# Patient Record
Sex: FEMALE | Race: WHITE | Employment: OTHER | URBAN - METROPOLITAN AREA
[De-identification: names, ages, dates, MRNs, and addresses within clinical notes are randomized per-mention and may not be internally consistent; named-entity substitution may affect disease eponyms.]

---

## 2018-03-20 ENCOUNTER — HOSPITAL ENCOUNTER (OUTPATIENT)
Dept: GENERAL RADIOLOGY | Age: 68
Discharge: HOME | End: 2018-03-22
Payer: MEDICARE

## 2018-03-20 ENCOUNTER — HOSPITAL ENCOUNTER (OUTPATIENT)
Age: 68
Discharge: HOME | End: 2018-03-22
Payer: MEDICARE

## 2018-03-20 DIAGNOSIS — M75.41 CORACOID IMPINGEMENT OF RIGHT SHOULDER: ICD-10-CM

## 2018-03-20 DIAGNOSIS — M79.601 RIGHT UPPER LIMB PAIN: ICD-10-CM

## 2018-03-20 PROCEDURE — 73030 X-RAY EXAM OF SHOULDER: CPT

## 2018-03-20 PROCEDURE — 73060 X-RAY EXAM OF HUMERUS: CPT

## 2018-06-08 ENCOUNTER — HOSPITAL ENCOUNTER (OUTPATIENT)
Dept: MAMMOGRAPHY | Age: 68
Discharge: HOME | End: 2018-06-10
Payer: MEDICARE

## 2018-06-08 DIAGNOSIS — Z13.820 SCREENING FOR OSTEOPOROSIS: ICD-10-CM

## 2018-06-08 PROCEDURE — 77080 DXA BONE DENSITY AXIAL: CPT

## 2018-08-13 ENCOUNTER — ANESTHESIA (OUTPATIENT)
Dept: ENDOSCOPY | Age: 68
End: 2018-08-13
Payer: MEDICARE

## 2018-08-13 ENCOUNTER — ANESTHESIA EVENT (OUTPATIENT)
Dept: ENDOSCOPY | Age: 68
End: 2018-08-13
Payer: MEDICARE

## 2018-08-13 ENCOUNTER — HOSPITAL ENCOUNTER (OUTPATIENT)
Age: 68
Setting detail: OUTPATIENT SURGERY
Discharge: HOME | End: 2018-08-13
Attending: INTERNAL MEDICINE | Admitting: INTERNAL MEDICINE
Payer: MEDICARE

## 2018-08-13 VITALS
OXYGEN SATURATION: 100 % | RESPIRATION RATE: 16 BRPM | DIASTOLIC BLOOD PRESSURE: 61 MMHG | SYSTOLIC BLOOD PRESSURE: 108 MMHG

## 2018-08-13 VITALS
BODY MASS INDEX: 28.85 KG/M2 | HEIGHT: 64 IN | TEMPERATURE: 97 F | OXYGEN SATURATION: 96 % | RESPIRATION RATE: 18 BRPM | HEART RATE: 65 BPM | WEIGHT: 169 LBS | DIASTOLIC BLOOD PRESSURE: 72 MMHG | SYSTOLIC BLOOD PRESSURE: 157 MMHG

## 2018-08-13 PROCEDURE — 88305 TISSUE EXAM BY PATHOLOGIST: CPT

## 2018-08-13 PROCEDURE — 7100000010 HC PHASE II RECOVERY - FIRST 15 MIN: Performed by: INTERNAL MEDICINE

## 2018-08-13 PROCEDURE — 2709999900 HC NON-CHARGEABLE SUPPLY: Performed by: INTERNAL MEDICINE

## 2018-08-13 PROCEDURE — 3700000000 HC ANESTHESIA ATTENDED CARE: Performed by: INTERNAL MEDICINE

## 2018-08-13 PROCEDURE — 2580000003 HC RX 258: Performed by: NURSE ANESTHETIST, CERTIFIED REGISTERED

## 2018-08-13 PROCEDURE — 6360000002 HC RX W HCPCS: Performed by: NURSE ANESTHETIST, CERTIFIED REGISTERED

## 2018-08-13 PROCEDURE — 3700000001 HC ADD 15 MINUTES (ANESTHESIA): Performed by: INTERNAL MEDICINE

## 2018-08-13 PROCEDURE — 3609010300 HC COLONOSCOPY W/BIOPSY SINGLE/MULTIPLE: Performed by: INTERNAL MEDICINE

## 2018-08-13 PROCEDURE — 7100000011 HC PHASE II RECOVERY - ADDTL 15 MIN: Performed by: INTERNAL MEDICINE

## 2018-08-13 PROCEDURE — C1773 RET DEV, INSERTABLE: HCPCS | Performed by: INTERNAL MEDICINE

## 2018-08-13 RX ORDER — SODIUM CHLORIDE 0.9 % (FLUSH) 0.9 %
10 SYRINGE (ML) INJECTION EVERY 12 HOURS SCHEDULED
Status: DISCONTINUED | OUTPATIENT
Start: 2018-08-13 | End: 2018-08-13 | Stop reason: HOSPADM

## 2018-08-13 RX ORDER — SODIUM CHLORIDE 0.9 % (FLUSH) 0.9 %
10 SYRINGE (ML) INJECTION PRN
Status: DISCONTINUED | OUTPATIENT
Start: 2018-08-13 | End: 2018-08-13 | Stop reason: HOSPADM

## 2018-08-13 RX ORDER — SODIUM CHLORIDE, SODIUM LACTATE, POTASSIUM CHLORIDE, CALCIUM CHLORIDE 600; 310; 30; 20 MG/100ML; MG/100ML; MG/100ML; MG/100ML
INJECTION, SOLUTION INTRAVENOUS CONTINUOUS
Status: DISCONTINUED | OUTPATIENT
Start: 2018-08-13 | End: 2018-08-13 | Stop reason: HOSPADM

## 2018-08-13 RX ORDER — PROPOFOL 10 MG/ML
INJECTION, EMULSION INTRAVENOUS CONTINUOUS PRN
Status: DISCONTINUED | OUTPATIENT
Start: 2018-08-13 | End: 2018-08-13 | Stop reason: SDUPTHER

## 2018-08-13 RX ORDER — SODIUM CHLORIDE, SODIUM LACTATE, POTASSIUM CHLORIDE, CALCIUM CHLORIDE 600; 310; 30; 20 MG/100ML; MG/100ML; MG/100ML; MG/100ML
INJECTION, SOLUTION INTRAVENOUS CONTINUOUS PRN
Status: DISCONTINUED | OUTPATIENT
Start: 2018-08-13 | End: 2018-08-13 | Stop reason: SDUPTHER

## 2018-08-13 RX ADMIN — PROPOFOL 100 MCG/KG/MIN: 10 INJECTION, EMULSION INTRAVENOUS at 09:03

## 2018-08-13 RX ADMIN — SODIUM CHLORIDE, POTASSIUM CHLORIDE, SODIUM LACTATE AND CALCIUM CHLORIDE: 600; 310; 30; 20 INJECTION, SOLUTION INTRAVENOUS at 08:59

## 2018-08-13 NOTE — ANESTHESIA POSTPROCEDURE EVALUATION
Department of Anesthesiology  Postprocedure Note    Patient: Arturo Weathers  MRN: [id number]  YOB: 1950  Date of evaluation: 8/13/2018  Time:  12:33 PM     Procedure Summary     Date:  08/13/18 Room / Location:  [facility] ENDO 01 / [address]    Anesthesia Start:  4558 Anesthesia Stop:  2310    Procedure:  COLONOSCOPY WITH BIOPSY (N/A ) Diagnosis:  (SCREENING)    Surgeon:  Jacob Prieto DO Responsible Provider:  Jigar Pedroza MD    Anesthesia Type:  MAC ASA Status:  3          Anesthesia Type: No value filed. Usama Phase I: Usama Score: 10    Usama Phase II:      Last vitals: Reviewed and per EMR flowsheets.        Anesthesia Post Evaluation    Patient location during evaluation: bedside  Patient participation: complete - patient participated  Level of consciousness: awake  Pain score: 2  Airway patency: patent  Nausea & Vomiting: no nausea  Complications: no  Cardiovascular status: blood pressure returned to baseline  Respiratory status: acceptable  Hydration status: euvolemic

## 2018-08-13 NOTE — OP NOTE
[address]                                 OPERATIVE REPORT    PATIENT NAME: Nessa Seen                     :        1950  MED REC NO:   [id number]                            ROOM:  ACCOUNT NO:   [de-identified]                           ADMIT DATE: 2018  PROVIDER:     Zuleyma Quintero DO    DATE OF PROCEDURE:  2018    PATIENT OF:  Dr. Andi Dixon DIAGNOSIS:  History of tubular adenoma of the colon. POSTOPERATIVE DIAGNOSIS:  A 5-mm sessile polyp in the cecum. SURGEON:  Zuleyma Quintero DO    PREOPERATIVE MEDICATION:  MAC.    COMPLICATIONS:  None. ESTIMATED BLOOD LOSS:  0.5 mL. OPERATIVE PROCEDURE:  This 61-year-old female was placed in left lateral  decubitus position after preop medication was given. The patient was very  comfortable throughout the test.  An Olympus video colonoscope was inserted  through the anal sphincter and passed to the cecum with some mild  difficulty with the patient having redundant sigmoid descending colon with  extremely poor tone. The colon prep was good. The cecum was well visualized with the patient having a 5-mm sessile polyp,  which was removed with forceps. The scope was withdrawn; and the  ascending, transverse and descending colon were well visualized with no  masses, polyps, ulcerations, or diverticulosis. No significant  inflammation. Sigmoid colon and rectum were well visualized with the patient having one  focal area of 4 x 2 cm inflammation with no ulcerations or growth. There  was no other evidence of any masses, polyps, diverticulosis, or AV  malformation. Pending pathology report, the patient probably will have repeat colonoscopy  in 5 years.         Roxann Vera DO    D: 2018 9:29:09       T: 2018 10:59:37     [email or web address]  Job#: [id number]     Doc#: [id number]    CC:

## 2018-08-13 NOTE — ANESTHESIA PRE PROCEDURE
Katlin Conner, DO        sodium chloride flush 0.9 % injection 10 mL  10 mL Intravenous PRN Katlin Conner, DO        lactated ringers infusion   Intravenous Continuous Hermes Garcia MD           Allergies:     Allergies   Allergen Reactions    Daypro [Oxaprozin]     Penicillins     Boniva [Ibandronic Acid] Other (See Comments)     \"pain in stomach\"       Problem List:    Patient Active Problem List   Diagnosis Code    Mitral insufficiency I34.0    Pulmonary hypertension ([facility] [address] I27.20    Mitral valve prolapse I34.1       Past Medical History:        Diagnosis Date    A-fib ([facility] [address]     Hyperlipidemia     Mitral insufficiency     Osteopenia        Past Surgical History:        Procedure Laterality Date    BREAST SURGERY      3 different lumectomies all benign    COLONOSCOPY      ECHO COMPL W DOP COLOR FLOW  10/17/2012         FOOT SURGERY      left    HYSTERECTOMY      SKIN BIOPSY      right leg, benign       Social History:    Social History   Substance Use Topics    Smoking status: Never Smoker    Smokeless tobacco: Never Used    Alcohol use No                                Counseling given: Not Answered      Vital Signs (Current):   Vitals:    08/10/18 0901 08/13/18 0800   BP:  (!) 167/79   Pulse:  73   Resp:  18   Temp:  97.6 °F (36.4 °C)   TempSrc:  Temporal   SpO2:  99%   Weight: 170 lb (77.1 kg) 169 lb (76.7 kg)   Height: 5' 4\" (1.626 m) 5' 4\" (1.626 m)                                              BP Readings from Last 3 Encounters:   08/13/18 (!) 167/79   11/14/16 (!) 145/70   07/01/13 130/60       NPO Status: Time of last liquid consumption: 0600                        Time of last solid consumption: 1700                        Date of last liquid consumption: 08/13/18                        Date of last solid food consumption: 08/11/18    BMI:   Wt Readings from Last 3 Encounters:   08/13/18 169 lb (76.7 kg)   11/14/16 168 lb (76.2 kg)   11/01/16 162 lb (73.5 kg)     Body mass index is 29.01 kg/m². CBC:   Lab Results   Component Value Date    WBC 8.3 10/18/2012    RBC 4.59 10/18/2012    HGB 14.1 10/18/2012    HCT 41.1 10/18/2012    MCV 89.4 10/18/2012    RDW 13.0 10/18/2012     10/18/2012       CMP:   Lab Results   Component Value Date     03/06/2015    K 4.7 03/06/2015     03/06/2015    CO2 31 03/06/2015    BUN 21 03/06/2015    CREATININE 1.0 03/06/2015    GFRAA >60 03/06/2015    LABGLOM 56 03/06/2015    GLUCOSE 100 03/06/2015    GLUCOSE 94 05/02/2012    PROT 6.6 03/06/2015    CALCIUM 9.9 03/06/2015    BILITOT 0.3 03/06/2015    ALKPHOS 39 03/06/2015    AST 17 03/06/2015    ALT 14 03/06/2015       POC Tests: No results for input(s): POCGLU, POCNA, POCK, POCCL, POCBUN, POCHEMO, POCHCT in the last 72 hours. Coags:   Lab Results   Component Value Date    PROTIME 13.4 07/01/2013    INR 1.3 07/01/2013       HCG (If Applicable): No results found for: PREGTESTUR, PREGSERUM, HCG, HCGQUANT     ABGs: No results found for: PHART, PO2ART, LGY1TPQ, QPS1KFO, BEART, E1DPLCIQ     Type & Screen (If Applicable):  No results found for: LABABO, [address]    Anesthesia Evaluation  Patient summary reviewed  Airway: Mallampati: III  TM distance: >3 FB   Neck ROM: full  Mouth opening: > = 3 FB Dental: normal exam         Pulmonary:Negative Pulmonary ROS and normal exam                               Cardiovascular:    (+) hypertension:, valvular problems/murmurs: MR, dysrhythmias: atrial fibrillation,                   Neuro/Psych:   Negative Neuro/Psych ROS              GI/Hepatic/Renal: Neg GI/Hepatic/Renal ROS            Endo/Other: Negative Endo/Other ROS                    Abdominal:           Vascular:                                        Anesthesia Plan      MAC     ASA 3       Induction: intravenous. Anesthetic plan and risks discussed with patient. Plan discussed with CRNA.                   Aguila Arciniega MD   8/13/2018

## 2019-07-12 ENCOUNTER — HOSPITAL ENCOUNTER (OUTPATIENT)
Dept: ULTRASOUND IMAGING | Age: 69
Discharge: HOME | End: 2019-07-12
Payer: MEDICARE

## 2019-07-12 DIAGNOSIS — E04.2 MULTIPLE THYROID NODULES: ICD-10-CM

## 2019-07-12 PROCEDURE — 76536 US EXAM OF HEAD AND NECK: CPT

## 2019-08-02 ENCOUNTER — HOSPITAL ENCOUNTER (OUTPATIENT)
Dept: ULTRASOUND IMAGING | Age: 69
Discharge: HOME | End: 2019-08-02
Payer: MEDICARE

## 2019-08-02 DIAGNOSIS — E04.1 LEFT THYROID NODULE: ICD-10-CM

## 2019-08-02 DIAGNOSIS — E04.1 RIGHT THYROID NODULE: ICD-10-CM

## 2019-08-02 PROCEDURE — 88305 TISSUE EXAM BY PATHOLOGIST: CPT

## 2019-08-02 PROCEDURE — 10005 FNA BX W/US GDN 1ST LES: CPT

## 2019-08-02 PROCEDURE — 88173 CYTOPATH EVAL FNA REPORT: CPT

## 2020-07-22 ENCOUNTER — HOSPITAL ENCOUNTER (OUTPATIENT)
Dept: MAMMOGRAPHY | Age: 70
Discharge: HOME | End: 2020-07-24
Payer: MEDICARE

## 2020-07-22 PROCEDURE — 77080 DXA BONE DENSITY AXIAL: CPT

## 2020-08-28 ENCOUNTER — HOSPITAL ENCOUNTER (OUTPATIENT)
Dept: ULTRASOUND IMAGING | Age: 70
Discharge: HOME | End: 2020-08-28
Payer: MEDICARE

## 2020-08-28 ENCOUNTER — HOSPITAL ENCOUNTER (OUTPATIENT)
Dept: MAMMOGRAPHY | Age: 70
Discharge: HOME | End: 2020-08-30
Payer: MEDICARE

## 2020-08-28 PROCEDURE — 77065 DX MAMMO INCL CAD UNI: CPT

## 2020-08-28 PROCEDURE — 76642 ULTRASOUND BREAST LIMITED: CPT

## 2021-03-11 ENCOUNTER — HOSPITAL ENCOUNTER (OUTPATIENT)
Dept: MAMMOGRAPHY | Age: 71
Discharge: HOME | End: 2021-03-13
Payer: MEDICARE

## 2021-03-11 ENCOUNTER — HOSPITAL ENCOUNTER (OUTPATIENT)
Dept: ULTRASOUND IMAGING | Age: 71
End: 2021-03-11
Payer: MEDICARE

## 2021-03-11 DIAGNOSIS — R92.8 ABNORMAL MAMMOGRAM: ICD-10-CM

## 2021-03-11 PROCEDURE — G0279 TOMOSYNTHESIS, MAMMO: HCPCS

## 2022-03-29 ENCOUNTER — HOSPITAL ENCOUNTER (OUTPATIENT)
Dept: MAMMOGRAPHY | Age: 72
Discharge: HOME | End: 2022-03-31
Payer: MEDICARE

## 2022-03-29 VITALS — BODY MASS INDEX: 29.88 KG/M2 | WEIGHT: 175 LBS | HEIGHT: 64 IN

## 2022-03-29 DIAGNOSIS — Z12.31 ENCOUNTER FOR SCREENING MAMMOGRAM FOR MALIGNANT NEOPLASM OF BREAST: ICD-10-CM

## 2022-03-29 PROCEDURE — 77063 BREAST TOMOSYNTHESIS BI: CPT

## 2023-04-25 ENCOUNTER — HOSPITAL ENCOUNTER (OUTPATIENT)
Dept: MAMMOGRAPHY | Age: 73
Discharge: HOME | End: 2023-04-27
Payer: MEDICARE

## 2023-04-25 VITALS — WEIGHT: 173 LBS | HEIGHT: 65 IN | BODY MASS INDEX: 28.82 KG/M2

## 2023-04-25 DIAGNOSIS — Z12.31 ENCOUNTER FOR SCREENING MAMMOGRAM FOR MALIGNANT NEOPLASM OF BREAST: ICD-10-CM

## 2023-04-25 DIAGNOSIS — Z12.31 SCREENING MAMMOGRAM FOR HIGH-RISK PATIENT: ICD-10-CM

## 2023-04-25 PROCEDURE — 77067 SCR MAMMO BI INCL CAD: CPT

## 2023-04-28 ENCOUNTER — TRANSCRIBE ORDERS (OUTPATIENT)
Dept: ADMINISTRATIVE | Age: 73
End: 2023-04-28

## 2023-04-28 DIAGNOSIS — N95.9 MENOPAUSAL DISORDER: Primary | ICD-10-CM

## 2023-06-16 ENCOUNTER — HOSPITAL ENCOUNTER (OUTPATIENT)
Dept: MAMMOGRAPHY | Age: 73
Discharge: HOME | End: 2023-06-18
Payer: MEDICARE

## 2023-06-16 DIAGNOSIS — N95.9 MENOPAUSAL DISORDER: ICD-10-CM

## 2023-06-16 PROCEDURE — 77080 DXA BONE DENSITY AXIAL: CPT

## 2024-03-05 ENCOUNTER — APPOINTMENT (OUTPATIENT)
Dept: RADIOLOGY | Facility: HOSPITAL | Age: 74
End: 2024-03-05
Payer: MEDICARE

## 2024-03-05 ENCOUNTER — HOSPITAL ENCOUNTER (OUTPATIENT)
Facility: HOSPITAL | Age: 74
Setting detail: OBSERVATION
Discharge: HOME | End: 2024-03-06
Attending: EMERGENCY MEDICINE | Admitting: INTERNAL MEDICINE
Payer: MEDICARE

## 2024-03-05 ENCOUNTER — APPOINTMENT (OUTPATIENT)
Dept: CARDIOLOGY | Facility: HOSPITAL | Age: 74
End: 2024-03-05
Payer: MEDICARE

## 2024-03-05 DIAGNOSIS — K21.9 GASTROESOPHAGEAL REFLUX DISEASE, UNSPECIFIED WHETHER ESOPHAGITIS PRESENT: Primary | ICD-10-CM

## 2024-03-05 DIAGNOSIS — K21.9 CHEST PAIN DUE TO GERD: ICD-10-CM

## 2024-03-05 DIAGNOSIS — R07.9 CHEST PAIN DUE TO GERD: ICD-10-CM

## 2024-03-05 LAB
ALBUMIN SERPL BCP-MCNC: 4.4 G/DL (ref 3.4–5)
ALP SERPL-CCNC: 51 U/L (ref 33–136)
ALT SERPL W P-5'-P-CCNC: 59 U/L (ref 7–45)
ANION GAP SERPL CALC-SCNC: 11 MMOL/L (ref 10–20)
AST SERPL W P-5'-P-CCNC: 87 U/L (ref 9–39)
BASOPHILS # BLD AUTO: 0.03 X10*3/UL (ref 0–0.1)
BASOPHILS NFR BLD AUTO: 0.2 %
BILIRUB SERPL-MCNC: 1.3 MG/DL (ref 0–1.2)
BUN SERPL-MCNC: 21 MG/DL (ref 6–23)
CALCIUM SERPL-MCNC: 9.5 MG/DL (ref 8.6–10.3)
CARDIAC TROPONIN I PNL SERPL HS: 5 NG/L (ref 0–13)
CARDIAC TROPONIN I PNL SERPL HS: 6 NG/L (ref 0–13)
CHLORIDE SERPL-SCNC: 100 MMOL/L (ref 98–107)
CO2 SERPL-SCNC: 29 MMOL/L (ref 21–32)
CREAT SERPL-MCNC: 1.05 MG/DL (ref 0.5–1.05)
D DIMER PPP FEU-MCNC: 268 NG/ML FEU
EGFRCR SERPLBLD CKD-EPI 2021: 57 ML/MIN/1.73M*2
EOSINOPHIL # BLD AUTO: 0.09 X10*3/UL (ref 0–0.4)
EOSINOPHIL NFR BLD AUTO: 0.7 %
ERYTHROCYTE [DISTWIDTH] IN BLOOD BY AUTOMATED COUNT: 12.5 % (ref 11.5–14.5)
GLUCOSE SERPL-MCNC: 139 MG/DL (ref 74–99)
HCT VFR BLD AUTO: 41.7 % (ref 36–46)
HGB BLD-MCNC: 13.9 G/DL (ref 12–16)
HOLD SPECIMEN: NORMAL
IMM GRANULOCYTES # BLD AUTO: 0.03 X10*3/UL (ref 0–0.5)
IMM GRANULOCYTES NFR BLD AUTO: 0.2 % (ref 0–0.9)
LYMPHOCYTES # BLD AUTO: 1.24 X10*3/UL (ref 0.8–3)
LYMPHOCYTES NFR BLD AUTO: 10 %
MCH RBC QN AUTO: 30.8 PG (ref 26–34)
MCHC RBC AUTO-ENTMCNC: 33.3 G/DL (ref 32–36)
MCV RBC AUTO: 92 FL (ref 80–100)
MONOCYTES # BLD AUTO: 0.86 X10*3/UL (ref 0.05–0.8)
MONOCYTES NFR BLD AUTO: 6.9 %
NEUTROPHILS # BLD AUTO: 10.18 X10*3/UL (ref 1.6–5.5)
NEUTROPHILS NFR BLD AUTO: 82 %
NRBC BLD-RTO: 0 /100 WBCS (ref 0–0)
PLATELET # BLD AUTO: 217 X10*3/UL (ref 150–450)
POTASSIUM SERPL-SCNC: 4.1 MMOL/L (ref 3.5–5.3)
PROT SERPL-MCNC: 7.3 G/DL (ref 6.4–8.2)
RBC # BLD AUTO: 4.52 X10*6/UL (ref 4–5.2)
SODIUM SERPL-SCNC: 136 MMOL/L (ref 136–145)
WBC # BLD AUTO: 12.4 X10*3/UL (ref 4.4–11.3)

## 2024-03-05 PROCEDURE — 36415 COLL VENOUS BLD VENIPUNCTURE: CPT | Performed by: EMERGENCY MEDICINE

## 2024-03-05 PROCEDURE — 71045 X-RAY EXAM CHEST 1 VIEW: CPT

## 2024-03-05 PROCEDURE — 93005 ELECTROCARDIOGRAM TRACING: CPT

## 2024-03-05 PROCEDURE — 94760 N-INVAS EAR/PLS OXIMETRY 1: CPT

## 2024-03-05 PROCEDURE — 84484 ASSAY OF TROPONIN QUANT: CPT | Performed by: EMERGENCY MEDICINE

## 2024-03-05 PROCEDURE — 2500000001 HC RX 250 WO HCPCS SELF ADMINISTERED DRUGS (ALT 637 FOR MEDICARE OP)

## 2024-03-05 PROCEDURE — G0378 HOSPITAL OBSERVATION PER HR: HCPCS

## 2024-03-05 PROCEDURE — 2500000004 HC RX 250 GENERAL PHARMACY W/ HCPCS (ALT 636 FOR OP/ED): Performed by: NURSE PRACTITIONER

## 2024-03-05 PROCEDURE — 2500000001 HC RX 250 WO HCPCS SELF ADMINISTERED DRUGS (ALT 637 FOR MEDICARE OP): Performed by: NURSE PRACTITIONER

## 2024-03-05 PROCEDURE — 96360 HYDRATION IV INFUSION INIT: CPT

## 2024-03-05 PROCEDURE — 80053 COMPREHEN METABOLIC PANEL: CPT | Performed by: EMERGENCY MEDICINE

## 2024-03-05 PROCEDURE — 71045 X-RAY EXAM CHEST 1 VIEW: CPT | Performed by: RADIOLOGY

## 2024-03-05 PROCEDURE — 2500000004 HC RX 250 GENERAL PHARMACY W/ HCPCS (ALT 636 FOR OP/ED): Performed by: EMERGENCY MEDICINE

## 2024-03-05 PROCEDURE — 85025 COMPLETE CBC W/AUTO DIFF WBC: CPT | Performed by: EMERGENCY MEDICINE

## 2024-03-05 PROCEDURE — 99285 EMERGENCY DEPT VISIT HI MDM: CPT | Mod: 25

## 2024-03-05 PROCEDURE — 85379 FIBRIN DEGRADATION QUANT: CPT | Performed by: EMERGENCY MEDICINE

## 2024-03-05 PROCEDURE — 2500000001 HC RX 250 WO HCPCS SELF ADMINISTERED DRUGS (ALT 637 FOR MEDICARE OP): Performed by: EMERGENCY MEDICINE

## 2024-03-05 RX ORDER — NAPROXEN SODIUM 220 MG/1
TABLET, FILM COATED ORAL
Status: COMPLETED
Start: 2024-03-05 | End: 2024-03-05

## 2024-03-05 RX ORDER — ACETAMINOPHEN 325 MG/1
650 TABLET ORAL EVERY 4 HOURS PRN
Status: DISCONTINUED | OUTPATIENT
Start: 2024-03-05 | End: 2024-03-06 | Stop reason: HOSPADM

## 2024-03-05 RX ORDER — TALC
3 POWDER (GRAM) TOPICAL DAILY
Status: DISCONTINUED | OUTPATIENT
Start: 2024-03-05 | End: 2024-03-06 | Stop reason: HOSPADM

## 2024-03-05 RX ORDER — CALCIUM CARBONATE/VITAMIN D3 600MG-5MCG
2 TABLET ORAL DAILY
COMMUNITY

## 2024-03-05 RX ORDER — ALENDRONATE SODIUM 70 MG/1
70 TABLET ORAL
COMMUNITY

## 2024-03-05 RX ORDER — AMIODARONE HYDROCHLORIDE 100 MG/1
100 TABLET ORAL DAILY
COMMUNITY

## 2024-03-05 RX ORDER — ONDANSETRON HYDROCHLORIDE 2 MG/ML
4 INJECTION, SOLUTION INTRAVENOUS EVERY 8 HOURS PRN
Status: DISCONTINUED | OUTPATIENT
Start: 2024-03-05 | End: 2024-03-06 | Stop reason: HOSPADM

## 2024-03-05 RX ORDER — LIDOCAINE HYDROCHLORIDE 20 MG/ML
15 SOLUTION OROPHARYNGEAL ONCE
Status: COMPLETED | OUTPATIENT
Start: 2024-03-05 | End: 2024-03-05

## 2024-03-05 RX ORDER — ATORVASTATIN CALCIUM 20 MG/1
20 TABLET, FILM COATED ORAL DAILY
COMMUNITY

## 2024-03-05 RX ORDER — PROPRANOLOL HYDROCHLORIDE 120 MG/1
120 CAPSULE, EXTENDED RELEASE ORAL DAILY
COMMUNITY

## 2024-03-05 RX ORDER — SODIUM CHLORIDE 9 MG/ML
125 INJECTION, SOLUTION INTRAVENOUS CONTINUOUS
Status: DISCONTINUED | OUTPATIENT
Start: 2024-03-05 | End: 2024-03-05

## 2024-03-05 RX ORDER — ALUMINUM HYDROXIDE, MAGNESIUM HYDROXIDE, AND SIMETHICONE 1200; 120; 1200 MG/30ML; MG/30ML; MG/30ML
30 SUSPENSION ORAL ONCE
Status: COMPLETED | OUTPATIENT
Start: 2024-03-05 | End: 2024-03-05

## 2024-03-05 RX ORDER — ASPIRIN 325 MG
325 TABLET ORAL DAILY
COMMUNITY

## 2024-03-05 RX ORDER — ASPIRIN 325 MG
200 TABLET, DELAYED RELEASE (ENTERIC COATED) ORAL DAILY
COMMUNITY

## 2024-03-05 RX ORDER — ACETAMINOPHEN 500 MG
5000 TABLET ORAL DAILY
COMMUNITY

## 2024-03-05 RX ORDER — LIDOCAINE HYDROCHLORIDE 20 MG/ML
1.25 SOLUTION OROPHARYNGEAL ONCE
Status: DISCONTINUED | OUTPATIENT
Start: 2024-03-05 | End: 2024-03-05

## 2024-03-05 RX ORDER — ENOXAPARIN SODIUM 100 MG/ML
40 INJECTION SUBCUTANEOUS EVERY 24 HOURS
Status: DISCONTINUED | OUTPATIENT
Start: 2024-03-05 | End: 2024-03-06 | Stop reason: HOSPADM

## 2024-03-05 RX ORDER — PANTOPRAZOLE SODIUM 40 MG/1
40 TABLET, DELAYED RELEASE ORAL
Status: DISCONTINUED | OUTPATIENT
Start: 2024-03-05 | End: 2024-03-06 | Stop reason: HOSPADM

## 2024-03-05 RX ORDER — VITAMIN E MIXED 400 UNIT
400 CAPSULE ORAL DAILY
COMMUNITY

## 2024-03-05 RX ORDER — NAPROXEN SODIUM 220 MG/1
324 TABLET, FILM COATED ORAL ONCE
Status: COMPLETED | OUTPATIENT
Start: 2024-03-05 | End: 2024-03-05

## 2024-03-05 RX ORDER — SODIUM CHLORIDE, SODIUM LACTATE, POTASSIUM CHLORIDE, CALCIUM CHLORIDE 600; 310; 30; 20 MG/100ML; MG/100ML; MG/100ML; MG/100ML
100 INJECTION, SOLUTION INTRAVENOUS CONTINUOUS
Status: DISCONTINUED | OUTPATIENT
Start: 2024-03-05 | End: 2024-03-06 | Stop reason: HOSPADM

## 2024-03-05 RX ORDER — LIDOCAINE HYDROCHLORIDE 20 MG/ML
SOLUTION OROPHARYNGEAL
Status: COMPLETED
Start: 2024-03-05 | End: 2024-03-05

## 2024-03-05 RX ORDER — BISMUTH SUBSALICYLATE 262 MG
1 TABLET,CHEWABLE ORAL DAILY
COMMUNITY

## 2024-03-05 RX ORDER — ONDANSETRON 4 MG/1
4 TABLET, ORALLY DISINTEGRATING ORAL EVERY 8 HOURS PRN
Status: DISCONTINUED | OUTPATIENT
Start: 2024-03-05 | End: 2024-03-06 | Stop reason: HOSPADM

## 2024-03-05 RX ORDER — POLYETHYLENE GLYCOL 3350 17 G/17G
17 POWDER, FOR SOLUTION ORAL DAILY PRN
Status: DISCONTINUED | OUTPATIENT
Start: 2024-03-05 | End: 2024-03-06 | Stop reason: HOSPADM

## 2024-03-05 RX ADMIN — NAPROXEN SODIUM 324 MG: 220 TABLET, FILM COATED ORAL at 15:12

## 2024-03-05 RX ADMIN — ALUMINUM HYDROXIDE, MAGNESIUM HYDROXIDE, AND SIMETHICONE 30 ML: 200; 200; 20 SUSPENSION ORAL at 16:44

## 2024-03-05 RX ADMIN — LIDOCAINE HYDROCHLORIDE 15 ML: 20 SOLUTION ORAL; TOPICAL at 16:52

## 2024-03-05 RX ADMIN — NITROGLYCERIN 0.5 INCH: 20 OINTMENT TOPICAL at 15:12

## 2024-03-05 RX ADMIN — ENOXAPARIN SODIUM 40 MG: 40 INJECTION SUBCUTANEOUS at 20:59

## 2024-03-05 RX ADMIN — SODIUM CHLORIDE 125 ML/HR: 9 INJECTION, SOLUTION INTRAVENOUS at 15:11

## 2024-03-05 RX ADMIN — MELATONIN TAB 3 MG 3 MG: 3 TAB at 20:59

## 2024-03-05 RX ADMIN — SODIUM CHLORIDE, POTASSIUM CHLORIDE, SODIUM LACTATE AND CALCIUM CHLORIDE 100 ML/HR: 600; 310; 30; 20 INJECTION, SOLUTION INTRAVENOUS at 20:59

## 2024-03-05 RX ADMIN — ASPIRIN 81 MG CHEWABLE TABLET 324 MG: 81 TABLET CHEWABLE at 15:12

## 2024-03-05 RX ADMIN — LIDOCAINE HYDROCHLORIDE 15 ML: 20 SOLUTION OROPHARYNGEAL at 16:52

## 2024-03-05 SDOH — SOCIAL STABILITY: SOCIAL INSECURITY: DO YOU FEEL ANYONE HAS EXPLOITED OR TAKEN ADVANTAGE OF YOU FINANCIALLY OR OF YOUR PERSONAL PROPERTY?: NO

## 2024-03-05 SDOH — SOCIAL STABILITY: SOCIAL INSECURITY: WERE YOU ABLE TO COMPLETE ALL THE BEHAVIORAL HEALTH SCREENINGS?: YES

## 2024-03-05 SDOH — SOCIAL STABILITY: SOCIAL INSECURITY: ABUSE: ADULT

## 2024-03-05 SDOH — SOCIAL STABILITY: SOCIAL INSECURITY: HAS ANYONE EVER THREATENED TO HURT YOUR FAMILY OR YOUR PETS?: NO

## 2024-03-05 SDOH — SOCIAL STABILITY: SOCIAL INSECURITY: ARE THERE ANY APPARENT SIGNS OF INJURIES/BEHAVIORS THAT COULD BE RELATED TO ABUSE/NEGLECT?: NO

## 2024-03-05 SDOH — SOCIAL STABILITY: SOCIAL INSECURITY: DOES ANYONE TRY TO KEEP YOU FROM HAVING/CONTACTING OTHER FRIENDS OR DOING THINGS OUTSIDE YOUR HOME?: NO

## 2024-03-05 SDOH — SOCIAL STABILITY: SOCIAL INSECURITY: HAVE YOU HAD THOUGHTS OF HARMING ANYONE ELSE?: NO

## 2024-03-05 SDOH — SOCIAL STABILITY: SOCIAL INSECURITY: DO YOU FEEL UNSAFE GOING BACK TO THE PLACE WHERE YOU ARE LIVING?: NO

## 2024-03-05 SDOH — SOCIAL STABILITY: SOCIAL INSECURITY: ARE YOU OR HAVE YOU BEEN THREATENED OR ABUSED PHYSICALLY, EMOTIONALLY, OR SEXUALLY BY ANYONE?: NO

## 2024-03-05 ASSESSMENT — LIFESTYLE VARIABLES
AUDIT-C TOTAL SCORE: 0
HOW OFTEN DO YOU HAVE A DRINK CONTAINING ALCOHOL: NEVER
HOW OFTEN DO YOU HAVE 6 OR MORE DRINKS ON ONE OCCASION: NEVER
SKIP TO QUESTIONS 9-10: 1
AUDIT-C TOTAL SCORE: 0
HOW MANY STANDARD DRINKS CONTAINING ALCOHOL DO YOU HAVE ON A TYPICAL DAY: PATIENT DOES NOT DRINK

## 2024-03-05 ASSESSMENT — COGNITIVE AND FUNCTIONAL STATUS - GENERAL
PATIENT BASELINE BEDBOUND: NO
DAILY ACTIVITIY SCORE: 24
MOBILITY SCORE: 24

## 2024-03-05 ASSESSMENT — PAIN DESCRIPTION - DESCRIPTORS: DESCRIPTORS: ACHING

## 2024-03-05 ASSESSMENT — COLUMBIA-SUICIDE SEVERITY RATING SCALE - C-SSRS
2. HAVE YOU ACTUALLY HAD ANY THOUGHTS OF KILLING YOURSELF?: NO
6. HAVE YOU EVER DONE ANYTHING, STARTED TO DO ANYTHING, OR PREPARED TO DO ANYTHING TO END YOUR LIFE?: NO
1. IN THE PAST MONTH, HAVE YOU WISHED YOU WERE DEAD OR WISHED YOU COULD GO TO SLEEP AND NOT WAKE UP?: NO

## 2024-03-05 ASSESSMENT — PATIENT HEALTH QUESTIONNAIRE - PHQ9
2. FEELING DOWN, DEPRESSED OR HOPELESS: NOT AT ALL
1. LITTLE INTEREST OR PLEASURE IN DOING THINGS: NOT AT ALL
SUM OF ALL RESPONSES TO PHQ9 QUESTIONS 1 & 2: 0

## 2024-03-05 ASSESSMENT — PAIN - FUNCTIONAL ASSESSMENT
PAIN_FUNCTIONAL_ASSESSMENT: 0-10
PAIN_FUNCTIONAL_ASSESSMENT: 0-10

## 2024-03-05 ASSESSMENT — HEART SCORE
HISTORY: HIGHLY SUSPICIOUS
AGE: 65+
ECG: NON-SPECIFIC REPOLARIZATION DISTURBANCE
RISK FACTORS: 1-2 RISK FACTORS
HEART SCORE: 6
TROPONIN: LESS THAN OR EQUAL TO NORMAL LIMIT

## 2024-03-05 ASSESSMENT — ACTIVITIES OF DAILY LIVING (ADL)
HEARING - RIGHT EAR: FUNCTIONAL
FEEDING YOURSELF: INDEPENDENT
BATHING: INDEPENDENT
DRESSING YOURSELF: INDEPENDENT
WALKS IN HOME: INDEPENDENT
TOILETING: INDEPENDENT
LACK_OF_TRANSPORTATION: NO
HEARING - LEFT EAR: FUNCTIONAL
JUDGMENT_ADEQUATE_SAFELY_COMPLETE_DAILY_ACTIVITIES: YES
ADEQUATE_TO_COMPLETE_ADL: YES
PATIENT'S MEMORY ADEQUATE TO SAFELY COMPLETE DAILY ACTIVITIES?: YES
GROOMING: INDEPENDENT

## 2024-03-05 ASSESSMENT — PAIN DESCRIPTION - LOCATION: LOCATION: CHEST

## 2024-03-05 ASSESSMENT — PAIN DESCRIPTION - PAIN TYPE: TYPE: ACUTE PAIN

## 2024-03-05 ASSESSMENT — PAIN SCALES - GENERAL
PAINLEVEL_OUTOF10: 0 - NO PAIN
PAINLEVEL_OUTOF10: 6

## 2024-03-05 NOTE — ED PROVIDER NOTES
[facility]   ED  Provider Note  3/5/2024  2:04 PM  AC05/AC05        History of Present Illness:   Pati Maddox is a 73 y.o. female presenting to the ED for chest pain, beginning 1 hour prior to arrival.  The complaint has been persistent, veer in severity, and worsened by nothing.  There are epigastric pain rating up into her chest jaw and left shoulder 1 hour prior to arrival.  She rated the pain as a 9 out of 10.  Is improved significantly is now down to 5 out of 10.  She describes nausea and diaphoresis to go with this.  She denies previous such episodes.  There is a strong family history of coronary artery disease.  The patient has no history of coronary disease,      Review of Systems:   Pertinent positives and review of systems as noted above.  Remaining 10 review of systems is negative or noncontributory to today's episode of care.  Review of Systems       --------------------------------------------- PAST HISTORY ---------------------------------------------  Past Medical History:  has no past medical history on file.    Past Surgical History:  has no past surgical history on file.    Social History:  reports that she has never smoked. She has never used smokeless tobacco.    Family History: family history is not on file. Unless otherwise noted, family history is non contributory    Patient's Medications    No medications on file      The patient’s home medications have been reviewed.    Allergies: Penicillins    -------------------------------------------------- RESULTS -------------------------------------------------  All laboratory and radiology results have been personally reviewed by myself   LABS:  Labs Reviewed   CBC WITH AUTO DIFFERENTIAL - Abnormal       Result Value    WBC 12.4 (*)     nRBC 0.0      RBC 4.52      Hemoglobin 13.9      Hematocrit 41.7      MCV 92      MCH 30.8      MCHC 33.3      RDW 12.5      Platelets 217      Neutrophils % 82.0      Immature Granulocytes %, Automated 0.2       Lymphocytes % 10.0      Monocytes % 6.9      Eosinophils % 0.7      Basophils % 0.2      Neutrophils Absolute 10.18 (*)     Immature Granulocytes Absolute, Automated 0.03      Lymphocytes Absolute 1.24      Monocytes Absolute 0.86 (*)     Eosinophils Absolute 0.09      Basophils Absolute 0.03     COMPREHENSIVE METABOLIC PANEL - Abnormal    Glucose 139 (*)     Sodium 136      Potassium 4.1      Chloride 100      Bicarbonate 29      Anion Gap 11      Urea Nitrogen 21      Creatinine 1.05      eGFR 57 (*)     Calcium 9.5      Albumin 4.4      Alkaline Phosphatase 51      Total Protein 7.3      AST 87 (*)     Bilirubin, Total 1.3 (*)     ALT 59 (*)    TROPONIN I, HIGH SENSITIVITY - Normal    Troponin I, High Sensitivity 5      Narrative:     Less than 99th percentile of normal range cutoff-  Female and children under 18 years old <14 ng/L; Male <21 ng/L: Negative  Repeat testing should be performed if clinically indicated.     Female and children under 18 years old 14-50 ng/L; Male 21-50 ng/L:  Consistent with possible cardiac damage and possible increased clinical   risk. Serial measurements may help to assess extent of myocardial damage.     >50 ng/L: Consistent with cardiac damage, increased clinical risk and  myocardial infarction. Serial measurements may help assess extent of   myocardial damage.      NOTE: Children less than 1 year old may have higher baseline troponin   levels and results should be interpreted in conjunction with the overall   clinical context.     NOTE: Troponin I testing is performed using a different   testing methodology at [facility] than at other   [facility]. Direct result comparisons should only   be made within the same method.   TROPONIN I, HIGH SENSITIVITY - Normal    Troponin I, High Sensitivity 6      Narrative:     Less than 99th percentile of normal range cutoff-  Female and children under 18 years old <14 ng/L; Male <21 ng/L: Negative  Repeat testing should be  performed if clinically indicated.     Female and children under 18 years old 14-50 ng/L; Male 21-50 ng/L:  Consistent with possible cardiac damage and possible increased clinical   risk. Serial measurements may help to assess extent of myocardial damage.     >50 ng/L: Consistent with cardiac damage, increased clinical risk and  myocardial infarction. Serial measurements may help assess extent of   myocardial damage.      NOTE: Children less than 1 year old may have higher baseline troponin   levels and results should be interpreted in conjunction with the overall   clinical context.     NOTE: Troponin I testing is performed using a different   testing methodology at [facility] than at other   [facility]. Direct result comparisons should only   be made within the same method.   D-DIMER, NON VTE - Normal    D-Dimer Non VTE, Quant (ng/mL FEU) 268      Narrative:     The D-Dimer assay is reported in ng/mL Fibrinogen Equivalent Units (FEU). The results of this assay should NOT be used for the exclusion of Deep Vein Thrombosis and/or Pulmonary Embolism.   GRAY TOP    Extra Tube Hold for add-ons.       EKG: Sinus rhythm at 75 bpm, normal axis, normal's, normal EKG, interpreted by RENATO Mcpherson MD.  EKG #2: Sinus rhythm at 64 bpm, normal axis, normal levels, no acute ST elevations, normal EKG.  Interpreted by RENATO Mcpherson MD    RADIOLOGY:  Interpreted by Radiologist.  XR chest 1 view   Final Result   No active disease in the chest.        MACRO:   None        Signed by: Lukas Espitia 3/5/2024 3:20 PM   Dictation workstation:   [id number]          Encounter Date: 03/05/24   ECG 12 lead   Result Value    Ventricular Rate 64    Atrial Rate 64    PA Interval 148    QRS Duration 82    QT Interval 436    QTC Calculation(Bazett) 449    P Axis 64    R Axis 35    T Axis 16    QRS Count 11    Q Onset 223    P Onset 149    P Offset 210    T Offset 441    QTC Fredericia 445    Narrative    Normal sinus rhythm  Normal  "ECG  When compared with ECG of 05-MAR-2024 14:10, (unconfirmed)  No significant change was found     ------------------------- NURSING NOTES AND VITALS REVIEWED ---------------------------   The nursing notes within the ED encounter and vital signs as below have been reviewed.   BP (!) 187/76   Pulse 63   Temp 36.8 °C (98.2 °F)   Resp 16   Ht 1.651 m (5' 5\")   Wt 67.5 kg (148 lb 13 oz)   SpO2 100%   BMI 24.76 kg/m²   Oxygen Saturation Interpretation: Normal      ---------------------------------------------------PHYSICAL EXAM--------------------------------------  Physical Exam   Constitutional/General: Alert and oriented x3, well appearing, non toxic in NAD  Head: Normocephalic and atraumatic  Eyes: PERRL, EOMI, conjunctiva normal, sclera non icteric  Mouth: Oropharynx clear, handling secretions, no trismus, no asymmetry of the posterior oropharynx or uvular edema  Neck: Supple, full ROM, non tender to palpation in the midline, no stridor, no crepitus, no meningeal signs  Respiratory: Lungs clear to auscultation bilaterally, no wheezes, rales, or rhonchi. Not in respiratory distress  Cardiovascular:  Regular rate. Regular rhythm. No murmurs, gallops, or rubs. 2+ distal pulses  Chest: No chest wall tenderness  GI:  Abdomen Soft, Non tender, Non distended.  +BS. No organomegaly, no palpable masses,  No rebound, guarding, or rigidity.   Musculoskeletal: Moves all extremities x 4. Warm and well perfused, no clubbing, cyanosis, or edema. Capillary refill <3 seconds  Integument: skin warm and dry. No rashes.   Lymphatic: no lymphadenopathy noted  Neurologic: No focal deficits, symmetric strength 5/5 in the upper and lower extremities bilaterally  Psychiatric: Normal Affect    Procedures    ------------------------------ ED COURSE/MEDICAL DECISION MAKING----------------------  Diagnoses as of 03/11/24 0817   Chest pain due to GERD      Patient has a heart score of 3 and a history of GERD in the past.  She her EKG " and cardiac enzymes are normal.  Chest x-ray is clear.  She has had good relief with symptomatic treatment for GERD.  She is stable for outpatient management.  She is advised follow-up with primary care doctor later this week.      Medical Decision Making:   Discharged to home  Diagnoses as of 03/11/24 0817   Chest pain due to GERD      Counseling:   The emergency provider has spoken with the patient and discussed today’s results, in addition to providing specific details for the plan of care and counseling regarding the diagnosis and prognosis.  Questions are answered at this time and they are agreeable with the plan.      --------------------------------- IMPRESSION AND DISPOSITION ---------------------------------        IMPRESSION  1. Gastroesophageal reflux disease, unspecified whether esophagitis present        DISPOSITION  Disposition: Discharge to home  Patient condition is fair      Billing Provider Critical Care Time: 0 minutes     Kraig Mcpherson MD  03/11/24 0803       Kraig Mcpherson MD  03/11/24 0806       Kraig Mcpherson MD  03/11/24 0815       Kraig Mcpherson MD  03/11/24 0817

## 2024-03-06 VITALS
HEIGHT: 65 IN | OXYGEN SATURATION: 94 % | WEIGHT: 171.4 LBS | HEART RATE: 73 BPM | TEMPERATURE: 99.4 F | BODY MASS INDEX: 28.56 KG/M2 | DIASTOLIC BLOOD PRESSURE: 69 MMHG | SYSTOLIC BLOOD PRESSURE: 142 MMHG | RESPIRATION RATE: 16 BRPM

## 2024-03-06 LAB
ALBUMIN SERPL BCP-MCNC: 3.4 G/DL (ref 3.4–5)
ANION GAP SERPL CALC-SCNC: 9 MMOL/L (ref 10–20)
BUN SERPL-MCNC: 15 MG/DL (ref 6–23)
CALCIUM SERPL-MCNC: 9 MG/DL (ref 8.6–10.3)
CHLORIDE SERPL-SCNC: 105 MMOL/L (ref 98–107)
CO2 SERPL-SCNC: 27 MMOL/L (ref 21–32)
CREAT SERPL-MCNC: 0.95 MG/DL (ref 0.5–1.05)
EGFRCR SERPLBLD CKD-EPI 2021: 64 ML/MIN/1.73M*2
ERYTHROCYTE [DISTWIDTH] IN BLOOD BY AUTOMATED COUNT: 12.7 % (ref 11.5–14.5)
GLUCOSE SERPL-MCNC: 94 MG/DL (ref 74–99)
HCT VFR BLD AUTO: 35 % (ref 36–46)
HGB BLD-MCNC: 12.1 G/DL (ref 12–16)
MAGNESIUM SERPL-MCNC: 1.93 MG/DL (ref 1.6–2.4)
MCH RBC QN AUTO: 31.8 PG (ref 26–34)
MCHC RBC AUTO-ENTMCNC: 34.6 G/DL (ref 32–36)
MCV RBC AUTO: 92 FL (ref 80–100)
NRBC BLD-RTO: 0 /100 WBCS (ref 0–0)
PHOSPHATE SERPL-MCNC: 3.3 MG/DL (ref 2.5–4.9)
PLATELET # BLD AUTO: 175 X10*3/UL (ref 150–450)
POTASSIUM SERPL-SCNC: 3.7 MMOL/L (ref 3.5–5.3)
RBC # BLD AUTO: 3.81 X10*6/UL (ref 4–5.2)
SODIUM SERPL-SCNC: 137 MMOL/L (ref 136–145)
WBC # BLD AUTO: 5.7 X10*3/UL (ref 4.4–11.3)

## 2024-03-06 PROCEDURE — 2500000004 HC RX 250 GENERAL PHARMACY W/ HCPCS (ALT 636 FOR OP/ED): Performed by: NURSE PRACTITIONER

## 2024-03-06 PROCEDURE — 83735 ASSAY OF MAGNESIUM: CPT | Performed by: NURSE PRACTITIONER

## 2024-03-06 PROCEDURE — G0378 HOSPITAL OBSERVATION PER HR: HCPCS

## 2024-03-06 PROCEDURE — 36415 COLL VENOUS BLD VENIPUNCTURE: CPT | Performed by: NURSE PRACTITIONER

## 2024-03-06 PROCEDURE — 80069 RENAL FUNCTION PANEL: CPT | Performed by: NURSE PRACTITIONER

## 2024-03-06 PROCEDURE — 2500000001 HC RX 250 WO HCPCS SELF ADMINISTERED DRUGS (ALT 637 FOR MEDICARE OP): Performed by: NURSE PRACTITIONER

## 2024-03-06 PROCEDURE — 85027 COMPLETE CBC AUTOMATED: CPT | Performed by: NURSE PRACTITIONER

## 2024-03-06 RX ORDER — ASPIRIN 325 MG
325 TABLET ORAL DAILY
Status: DISCONTINUED | OUTPATIENT
Start: 2024-03-06 | End: 2024-03-06 | Stop reason: HOSPADM

## 2024-03-06 RX ORDER — PROPRANOLOL HYDROCHLORIDE 60 MG/1
120 CAPSULE, EXTENDED RELEASE ORAL DAILY
Status: DISCONTINUED | OUTPATIENT
Start: 2024-03-06 | End: 2024-03-06 | Stop reason: HOSPADM

## 2024-03-06 RX ORDER — FERROUS SULFATE, DRIED 160(50) MG
2 TABLET, EXTENDED RELEASE ORAL DAILY
Status: DISCONTINUED | OUTPATIENT
Start: 2024-03-06 | End: 2024-03-06 | Stop reason: HOSPADM

## 2024-03-06 RX ORDER — ALENDRONATE SODIUM 70 MG/1
70 TABLET ORAL
Status: DISCONTINUED | OUTPATIENT
Start: 2024-03-06 | End: 2024-03-06

## 2024-03-06 RX ORDER — ACETAMINOPHEN 500 MG
5000 TABLET ORAL DAILY
Status: DISCONTINUED | OUTPATIENT
Start: 2024-03-06 | End: 2024-03-06 | Stop reason: HOSPADM

## 2024-03-06 RX ORDER — PANTOPRAZOLE SODIUM 40 MG/1
40 TABLET, DELAYED RELEASE ORAL
Qty: 30 TABLET | Refills: 0 | Status: SHIPPED | OUTPATIENT
Start: 2024-03-07

## 2024-03-06 RX ORDER — AMIODARONE HYDROCHLORIDE 200 MG/1
100 TABLET ORAL DAILY
Status: DISCONTINUED | OUTPATIENT
Start: 2024-03-06 | End: 2024-03-06 | Stop reason: HOSPADM

## 2024-03-06 RX ADMIN — SODIUM CHLORIDE, POTASSIUM CHLORIDE, SODIUM LACTATE AND CALCIUM CHLORIDE 100 ML/HR: 600; 310; 30; 20 INJECTION, SOLUTION INTRAVENOUS at 06:38

## 2024-03-06 RX ADMIN — PANTOPRAZOLE SODIUM 40 MG: 40 TABLET, DELAYED RELEASE ORAL at 06:36

## 2024-03-06 ASSESSMENT — COGNITIVE AND FUNCTIONAL STATUS - GENERAL
DAILY ACTIVITIY SCORE: 24
MOBILITY SCORE: 24

## 2024-03-06 ASSESSMENT — PAIN - FUNCTIONAL ASSESSMENT
PAIN_FUNCTIONAL_ASSESSMENT: 0-10
PAIN_FUNCTIONAL_ASSESSMENT: 0-10

## 2024-03-06 ASSESSMENT — PAIN SCALES - GENERAL
PAINLEVEL_OUTOF10: 0 - NO PAIN

## 2024-03-06 NOTE — CARE PLAN
The patient's goals for the shift include  no chest pain    The clinical goals for the shift include patient will not have any episodes of chest pain this shift    Over the shift, the patient did make progress having no episodes of chest pain/discomfort.

## 2024-03-06 NOTE — H&P
"History of Present Illness  Pati Maddox is a 73 y.o. female  with PMHx significant for osteoporosis and HLD. She is on home medications of amiodarone, propanolol and fosomax. No other history is found in review of the chart. Pati presented to the [place] ED with 10/10 epigastric pain that radiated to the sternum after taking a walk and picking up sticks. She thought it was just a bubble and that it would go away. She denies any shortness of breath and there were no relieving factors such as rest. Expressed that she was \"sweaty\" and had some nausea. The nausea resolved after having a bowel movement. She expressed that she used to be on omeprazole before she retired because she would get a raw feeling on her tongue and acid reflux. But that went away when she retired. ED work up essentially benign and was placed on some nitropaste and given asa while waiting for testing. The patient expressed that the GI cocktail that was given in the ED is what really took away the pain in her epigastric area.  Her monitor is unremarkable.     12 Point ROS negative unless noted in above HPI     Past Medical History  History reviewed. No pertinent past medical history.    Surgical History  History reviewed. No pertinent surgical history.     Social History  She reports that she has never smoked. She has never used smokeless tobacco. No history on file for alcohol use and drug use.    Allergies  Boniva [ibandronate], Daypro [oxaprozin], and Penicillins     Physical Exam  Constitutional:       General: She is not in acute distress.     Appearance: Normal appearance. She is not ill-appearing.   HENT:      Head: Atraumatic.      Nose: Nose normal.      Mouth/Throat:      Mouth: Mucous membranes are moist.   Eyes:      Extraocular Movements: Extraocular movements intact.      Pupils: Pupils are equal, round, and reactive to light.   Cardiovascular:      Rate and Rhythm: Normal rate.      Pulses: Normal pulses.   Pulmonary:      " "Effort: Pulmonary effort is normal.      Breath sounds: Normal breath sounds.   Abdominal:      General: Bowel sounds are normal. There is no distension.      Palpations: Abdomen is soft.      Tenderness: There is no abdominal tenderness.   Musculoskeletal:         General: Normal range of motion.      Cervical back: Normal range of motion.   Skin:     General: Skin is warm and dry.      Capillary Refill: Capillary refill takes less than 2 seconds.   Neurological:      General: No focal deficit present.      Mental Status: She is alert and oriented to person, place, and time.   Psychiatric:         Mood and Affect: Mood normal.         Behavior: Behavior normal.          Last Recorded Vitals  Blood pressure 142/69, pulse 73, temperature 37.4 °C (99.4 °F), temperature source Temporal, resp. rate 16, height 1.651 m (5' 5\"), weight 77.7 kg (171 lb 6.4 oz), SpO2 94 %.    Relevant Results  Scheduled medications  alendronate, 70 mg, oral, q7 days  amiodarone, 100 mg, oral, Daily  aspirin, 325 mg, oral, Daily  calcium carbonate-vitamin D3, 2 tablet, oral, Daily  cholecalciferol, 5,000 Units, oral, Daily  enoxaparin, 40 mg, subcutaneous, q24h  melatonin, 3 mg, oral, Daily  pantoprazole, 40 mg, oral, Daily before breakfast  propranolol LA, 120 mg, oral, Daily      Continuous medications  lactated Ringer's, 100 mL/hr, Last Rate: 100 mL/hr (03/06/24 0638)      PRN medications  PRN medications: acetaminophen, ondansetron ODT **OR** ondansetron, polyethylene glycol     Results for orders placed or performed during the hospital encounter of 03/05/24 (from the past 24 hour(s))   CBC with Differential   Result Value Ref Range    WBC 12.4 (H) 4.4 - 11.3 x10*3/uL    nRBC 0.0 0.0 - 0.0 /100 WBCs    RBC 4.52 4.00 - 5.20 x10*6/uL    Hemoglobin 13.9 12.0 - 16.0 g/dL    Hematocrit 41.7 36.0 - 46.0 %    MCV 92 80 - 100 fL    MCH 30.8 26.0 - 34.0 pg    MCHC 33.3 32.0 - 36.0 g/dL    RDW 12.5 11.5 - 14.5 %    Platelets 217 150 - 450 x10*3/uL "    Neutrophils % 82.0 40.0 - 80.0 %    Immature Granulocytes %, Automated 0.2 0.0 - 0.9 %    Lymphocytes % 10.0 13.0 - 44.0 %    Monocytes % 6.9 2.0 - 10.0 %    Eosinophils % 0.7 0.0 - 6.0 %    Basophils % 0.2 0.0 - 2.0 %    Neutrophils Absolute 10.18 (H) 1.60 - 5.50 x10*3/uL    Immature Granulocytes Absolute, Automated 0.03 0.00 - 0.50 x10*3/uL    Lymphocytes Absolute 1.24 0.80 - 3.00 x10*3/uL    Monocytes Absolute 0.86 (H) 0.05 - 0.80 x10*3/uL    Eosinophils Absolute 0.09 0.00 - 0.40 x10*3/uL    Basophils Absolute 0.03 0.00 - 0.10 x10*3/uL   Comprehensive Metabolic Panel   Result Value Ref Range    Glucose 139 (H) 74 - 99 mg/dL    Sodium 136 136 - 145 mmol/L    Potassium 4.1 3.5 - 5.3 mmol/L    Chloride 100 98 - 107 mmol/L    Bicarbonate 29 21 - 32 mmol/L    Anion Gap 11 10 - 20 mmol/L    Urea Nitrogen 21 6 - 23 mg/dL    Creatinine 1.05 0.50 - 1.05 mg/dL    eGFR 57 (L) >60 mL/min/1.73m*2    Calcium 9.5 8.6 - 10.3 mg/dL    Albumin 4.4 3.4 - 5.0 g/dL    Alkaline Phosphatase 51 33 - 136 U/L    Total Protein 7.3 6.4 - 8.2 g/dL    AST 87 (H) 9 - 39 U/L    Bilirubin, Total 1.3 (H) 0.0 - 1.2 mg/dL    ALT 59 (H) 7 - 45 U/L   Troponin I, High Sensitivity   Result Value Ref Range    Troponin I, High Sensitivity 5 0 - 13 ng/L   ECG 12 lead   Result Value Ref Range    Ventricular Rate 75 BPM    Atrial Rate 75 BPM    OK Interval 152 ms    QRS Duration 82 ms    QT Interval 424 ms    QTC Calculation(Bazett) 473 ms    P Axis 65 degrees    R Axis 34 degrees    T Axis 22 degrees    QRS Count 13 beats    Q Onset 223 ms    P Onset 147 ms    P Offset 208 ms    T Offset 435 ms    QTC Fredericia 456 ms   Light Blue Top   Result Value Ref Range    Extra Tube Hold for add-ons.    Red Top   Result Value Ref Range    Extra Tube Hold for add-ons.    Gray Top   Result Value Ref Range    Extra Tube Hold for add-ons.    ECG 12 lead   Result Value Ref Range    Ventricular Rate 64 BPM    Atrial Rate 64 BPM    OK Interval 148 ms    QRS Duration 82  ms    QT Interval 436 ms    QTC Calculation(Bazett) 449 ms    P Axis 64 degrees    R Axis 35 degrees    T Axis 16 degrees    QRS Count 11 beats    Q Onset 223 ms    P Onset 149 ms    P Offset 210 ms    T Offset 441 ms    QTC Fredericia 445 ms   Troponin I, High Sensitivity   Result Value Ref Range    Troponin I, High Sensitivity 6 0 - 13 ng/L   D-dimer, quantitative   Result Value Ref Range    D-Dimer Non VTE, Quant (ng/mL FEU) 268 <=500 ng/mL FEU   CBC   Result Value Ref Range    WBC 5.7 4.4 - 11.3 x10*3/uL    nRBC 0.0 0.0 - 0.0 /100 WBCs    RBC 3.81 (L) 4.00 - 5.20 x10*6/uL    Hemoglobin 12.1 12.0 - 16.0 g/dL    Hematocrit 35.0 (L) 36.0 - 46.0 %    MCV 92 80 - 100 fL    MCH 31.8 26.0 - 34.0 pg    MCHC 34.6 32.0 - 36.0 g/dL    RDW 12.7 11.5 - 14.5 %    Platelets 175 150 - 450 x10*3/uL   Renal Function Panel   Result Value Ref Range    Glucose 94 74 - 99 mg/dL    Sodium 137 136 - 145 mmol/L    Potassium 3.7 3.5 - 5.3 mmol/L    Chloride 105 98 - 107 mmol/L    Bicarbonate 27 21 - 32 mmol/L    Anion Gap 9 (L) 10 - 20 mmol/L    Urea Nitrogen 15 6 - 23 mg/dL    Creatinine 0.95 0.50 - 1.05 mg/dL    eGFR 64 >60 mL/min/1.73m*2    Calcium 9.0 8.6 - 10.3 mg/dL    Phosphorus 3.3 2.5 - 4.9 mg/dL    Albumin 3.4 3.4 - 5.0 g/dL   Magnesium   Result Value Ref Range    Magnesium 1.93 1.60 - 2.40 mg/dL        Imaging  ECG 12 lead    Result Date: 3/5/2024  Normal sinus rhythm Normal ECG When compared with ECG of 05-MAR-2024 14:10, (unconfirmed) No significant change was found    XR chest 1 view    Result Date: 3/5/2024  Interpreted By:  Lukas Espitia, STUDY: XR CHEST 1 VIEW;  3/5/2024 3:11 pm   INDICATION: Signs/Symptoms:chest pain.   COMPARISON: None.   ACCESSION NUMBER(S): [id number]   ORDERING CLINICIAN: AAORN MERCERDAYANIRA   FINDINGS: The patient is significantly rotated which can limit evaluation. No definite focal infiltrate. No visualized pleural effusion. Possible cardiomegaly. Aortic atherosclerosis. No pulmonary vascular  congestion.       No active disease in the chest.   MACRO: None   Signed by: Lukas Nupur 3/5/2024 3:20 PM Dictation workstation:   [id number]    ECG 12 lead    Result Date: 3/5/2024  Normal sinus rhythm Normal ECG When compared with ECG of 18-MAR-2022 13:15, No significant change was found       Assessment/Plan    #Epigastric pain  ~10/10 pain relieved with GI cocktail  ~has history of GERD and was on omeprazole  ~stress test two weeks ago negative.   ~will discharge on protonix and she will need to follow up with her PCP.    #HTN  ~stable on amiodarone and propanolol  ~suspect has migraines  ~continue asa but would recommend low-dose 2/2 to her severe GERD symptoms.     Disposition: Admitted 2/2 epigastric pain. Plan for discharge today with new medication of protonix.     I spent 45 minutes in the professional and overall care of this patient.      DEACON Cartwright  Internal Medicine    Patient was seen in collaboration with the SHARRI, Lisa WORTHY.  I was present for the pertinent components of the history, physical, and medical decision making and independently examined the patient.  We reviewed the medication reconciliation list and ancillary studies, including lab, imaging, and microbiology, as well as discussed the differential diagnoses; which includes, but is not limited to Principal Problem:    Chest pain  .    No evidence in history or ancillary testing to support cardiac chest pain or acs.  Behaving like GI/GERD.  Also, patient had negative 2 weeks two weeks ago.    I agree with her plan as documented in the electronic medical record.    Shashi Vogel MD

## 2024-03-06 NOTE — DISCHARGE SUMMARY
"Discharge Diagnosis  Chest pain    Issues Requiring Follow-Up  none    Test Results Pending At Discharge  Pending Labs       No current pending labs.            Hospital Course   Pati Maddox is a 73 y.o. female  with PMHx significant for osteoporosis and HLD. She is on home medications of amiodarone, propanolol and fosomax. No other history is found in review of the chart. Pati presented to the [place] ED with 10/10 epigastric pain that radiated to the sternum after taking a walk and picking up sticks. She thought it was just a bubble and that it would go away. She denies any shortness of breath and there were no relieving factors such as rest. Expressed that she was \"sweaty\" and had some nausea. The nausea resolved after having a bowel movement. She expressed that she used to be on omeprazole before she retired because she would get a raw feeling on her tongue and acid reflux. But that went away when she retired. ED work up essentially benign and was placed on some nitropaste and given asa while waiting for testing. The patient expressed that the GI cocktail that was given in the ED is what really took away the pain in her epigastric area.  Her monitor is unremarkable.     No evidence in history or ancillary testing to support cardiac chest pain or acs.  Behaving like GI/GERD. Also, patient had negative 2 weeks two weeks ago.    I agree with her plan as documented in the electronic medical record.       Pertinent Physical Exam At Time of Discharge  Constitutional:       General: She is not in acute distress.     Appearance: Normal appearance. She is not ill-appearing.   HENT:      Head: Atraumatic.      Nose: Nose normal.      Mouth/Throat:      Mouth: Mucous membranes are moist.   Eyes:      Extraocular Movements: Extraocular movements intact.      Pupils: Pupils are equal, round, and reactive to light.   Cardiovascular:      Rate and Rhythm: Normal rate.      Pulses: Normal pulses.   Pulmonary:      " Effort: Pulmonary effort is normal.      Breath sounds: Normal breath sounds.   Abdominal:      General: Bowel sounds are normal. There is no distension.      Palpations: Abdomen is soft.      Tenderness: There is no abdominal tenderness.   Musculoskeletal:         General: Normal range of motion.      Cervical back: Normal range of motion.   Skin:     General: Skin is warm and dry.      Capillary Refill: Capillary refill takes less than 2 seconds.   Neurological:      General: No focal deficit present.      Mental Status: She is alert and oriented to person, place, and time.   Psychiatric:         Mood and Affect: Mood normal.         Behavior: Behavior normal.     Home Medications     Medication List      START taking these medications     pantoprazole 40 mg EC tablet; Commonly known as: ProtoNix; Take 1 tablet   (40 mg) by mouth once daily in the morning. Take before meals. Do not   crush, chew, or split. Do not start before March 7, 2024.; Start taking   on: March 7, 2024     CONTINUE taking these medications     alendronate 70 mg tablet; Commonly known as: Fosamax   amiodarone 100 mg tablet; Commonly known as: Pacerone   aspirin 325 mg tablet   atorvastatin 20 mg tablet; Commonly known as: Lipitor   calcium carbonate-vitamin D3 600 mg-5 mcg (200 unit) tablet   cholecalciferol 5,000 Units tablet; Commonly known as: Vitamin D-3   co-enzyme Q-10 50 mg capsule   multivitamin tablet   propranolol  mg 24 hr capsule; Commonly known as: Inderal LA   vitamin E 180 mg (400 unit) capsule     #Epigastric pain  ~10/10 pain relieved with GI cocktail  ~has history of GERD and was on omeprazole  ~stress test two weeks ago negative.   ~will discharge on protonix and she will need to follow up with her PCP.     #HTN  ~stable on amiodarone and propanolol  ~suspect has migraines  ~continue asa but would recommend low-dose 2/2 to her severe GERD symptoms.      Disposition: Admitted 2/2 epigastric pain. Plan for discharge  today with new medication of protonix.   Outpatient Follow-Up  No future appointments.    Shashi Vogel MD

## 2024-03-07 LAB
ATRIAL RATE: 64 BPM
ATRIAL RATE: 75 BPM
P AXIS: 64 DEGREES
P AXIS: 65 DEGREES
P OFFSET: 208 MS
P OFFSET: 210 MS
P ONSET: 147 MS
P ONSET: 149 MS
PR INTERVAL: 148 MS
PR INTERVAL: 152 MS
Q ONSET: 223 MS
Q ONSET: 223 MS
QRS COUNT: 11 BEATS
QRS COUNT: 13 BEATS
QRS DURATION: 82 MS
QRS DURATION: 82 MS
QT INTERVAL: 424 MS
QT INTERVAL: 436 MS
QTC CALCULATION(BAZETT): 449 MS
QTC CALCULATION(BAZETT): 473 MS
QTC FREDERICIA: 445 MS
QTC FREDERICIA: 456 MS
R AXIS: 34 DEGREES
R AXIS: 35 DEGREES
T AXIS: 16 DEGREES
T AXIS: 22 DEGREES
T OFFSET: 435 MS
T OFFSET: 441 MS
VENTRICULAR RATE: 64 BPM
VENTRICULAR RATE: 75 BPM

## 2024-05-15 ENCOUNTER — HOSPITAL ENCOUNTER (OUTPATIENT)
Dept: MAMMOGRAPHY | Age: 74
Discharge: HOME | End: 2024-05-17
Payer: MEDICARE

## 2024-05-15 VITALS — BODY MASS INDEX: 28.32 KG/M2 | HEIGHT: 65 IN | WEIGHT: 170 LBS

## 2024-05-15 DIAGNOSIS — Z12.31 ENCOUNTER FOR SCREENING MAMMOGRAM FOR MALIGNANT NEOPLASM OF BREAST: ICD-10-CM

## 2024-05-15 PROCEDURE — 77063 BREAST TOMOSYNTHESIS BI: CPT

## 2025-04-29 ENCOUNTER — TRANSCRIBE ORDERS (OUTPATIENT)
Dept: ADMINISTRATIVE | Age: 75
End: 2025-04-29

## 2025-04-29 DIAGNOSIS — Z12.31 ENCOUNTER FOR SCREENING MAMMOGRAM FOR MALIGNANT NEOPLASM OF BREAST: Primary | ICD-10-CM

## 2025-05-19 ENCOUNTER — HOSPITAL ENCOUNTER (OUTPATIENT)
Dept: MAMMOGRAPHY | Age: 75
Discharge: HOME | End: 2025-05-21
Payer: COMMERCIAL

## 2025-05-19 DIAGNOSIS — Z12.31 ENCOUNTER FOR SCREENING MAMMOGRAM FOR MALIGNANT NEOPLASM OF BREAST: ICD-10-CM

## 2025-05-19 PROCEDURE — 77063 BREAST TOMOSYNTHESIS BI: CPT

## 2025-06-25 ENCOUNTER — TRANSCRIBE ORDERS (OUTPATIENT)
Dept: ADMINISTRATIVE | Age: 75
End: 2025-06-25

## 2025-06-25 DIAGNOSIS — N95.9 MENOPAUSAL DISORDER: Primary | ICD-10-CM

## 2025-07-22 ENCOUNTER — HOSPITAL ENCOUNTER (OUTPATIENT)
Dept: MAMMOGRAPHY | Age: 75
Discharge: HOME | End: 2025-07-24
Payer: MEDICARE

## 2025-07-22 DIAGNOSIS — N95.9 MENOPAUSAL DISORDER: ICD-10-CM

## 2025-07-22 PROCEDURE — 77080 DXA BONE DENSITY AXIAL: CPT

## (undated) DEVICE — GOWN ISOLATN REG YEL M WT MULTIPLY SIDETIE LEV 2

## (undated) DEVICE — Device: Brand: DEFENDO VALVE AND CONNECTOR KIT

## (undated) DEVICE — FORCEPS BX L240CM JAW DIA2.8MM L CAP W/ NDL MIC MESH TOOTH

## (undated) DEVICE — KENDALL 450 SERIES MONITORING FOAM ELECTRODE - RECTANGULAR SHAPE ( 3/PK): Brand: KENDALL

## (undated) DEVICE — 6 X 9  1.75MIL 4-WALL LABGUARD: Brand: MINIGRIP COMMERCIAL LLC

## (undated) DEVICE — LUBRICANT SURG JELLY ST BACTER TUBE 4.25OZ

## (undated) DEVICE — SPONGE GZ 4IN 4IN 4 PLY N WVN AVANT

## (undated) DEVICE — CONTAINER SPEC COLL 960ML POLYPR TRIANG GRAD INTAKE/OUTPUT

## (undated) DEVICE — KIT BEDSIDE REVITAL OX 500ML

## (undated) DEVICE — MASK,FACE,MAXFLUIDPROTECT,SHIELD/ERLPS: Brand: MEDLINE

## (undated) DEVICE — TUBING, SUCTION, 1/4" X 10', STRAIGHT: Brand: MEDLINE